# Patient Record
(demographics unavailable — no encounter records)

---

## 2025-01-27 NOTE — HISTORY OF PRESENT ILLNESS
[TextBox_4] : 59 yo man referred by Sridevi Parnell for pulmonary evaluation Had seen Dr Jodi Licea, pulmonary in the past  Smoker for 40 years, now progressively decreasing to 1/2 ppd after stenting in fall 2024  Given inhaler in past, doesnt know which Has had LCS last in Feb 2023, 2 small nodules in RUL  CAD, s/p stent at [facility] by Dr Ramirez in 2024 probable left main lesion HT on metoprolol, lisiopril, asa HLD on lipitor GERD on omeprazole Works as samira, , 2 children, works in [employer], lives in

## 2025-01-27 NOTE — PHYSICAL EXAM
[No Acute Distress] : no acute distress [Normal Oropharynx] : normal oropharynx [Normal Appearance] : normal appearance [No Neck Mass] : no neck mass [Normal Rate/Rhythm] : normal rate/rhythm [Normal S1, S2] : normal s1, s2 [No Murmurs] : no murmurs [No Resp Distress] : no resp distress [No Abnormalities] : no abnormalities [Benign] : benign [Normal Gait] : normal gait [No Clubbing] : no clubbing [No Cyanosis] : no cyanosis [No Edema] : no edema [FROM] : FROM [Normal Color/ Pigmentation] : normal color/ pigmentation [No Focal Deficits] : no focal deficits [Oriented x3] : oriented x3 [Normal Affect] : normal affect [TextBox_68] : Inspiratory wheezing is noted

## 2025-01-27 NOTE — ASSESSMENT
[FreeTextEntry1] : 57 yo man referred by Sridevi Parnell for pulmonary evaluation Had seen Dr Jodi Licea, pulmonary in the past  Smoker for 40 years, now progressively decreasing to 1/2 ppd after stenting in fall 2024  Given inhaler in past, doesnt know which Has had LCS last in Feb 2023, 2 small nodules in RUL  CAD, s/p stent at [facility] by Dr Ramirez in 2024 probable left main lesion HT on metoprolol, lisiopril, asa HLD on lipitor GERD on omeprazole Works as [employer], , 2 children, works in [employer], lives in [facility] 57 yo man with smoking history and wheezing Hx CAD, HT HLD GERD Probable COPD Last Lung Cancer screening at [facility] in Feb 2023--he is due again Will obtain full PFTs However, in view of wheezing, will begin stiolto and albuterol PRN RTC at time of his PFTs

## 2025-02-10 NOTE — HISTORY OF PRESENT ILLNESS
[Current] : Current [TextBox_13] : Mr. MORAN is a 58 year old male with a history of CAD and COPD.   He was called to review eligibility for Low-Dose CT lung cancer screening.  Reviewed and confirmed that the patient meets screening eligibility criteria:   58 years old   Smoking Status: Current Smoker   Number of pack(s) per day: 1 Number of years smoked: 40 Number of pack years smokin   No symptoms of lung cancer, including new cough, change in cough, hemoptysis, and unintentional weight loss.   No personal history of lung cancer.  No lung cancer in a first degree relative.  Exposure to silica. [PacksperYear] : 40

## 2025-03-17 NOTE — CONSULT LETTER
[Dear  ___] : Dear  [unfilled], [FreeTextEntry1] : I had the pleasure of evaluating your patient, JANNY MORAN , in the office today.  Please review my consultation and evaluation report that follows below.  Please do not hesitate to call me if further information is necessary or if you wish to discuss ongoing care or diagnostic work-up.    I very much appreciate your referral and it is a privilege to be able to provide care for your patient.  Sincerely,   Carlo Tan MD, MHCM, FACP, REJI-C Pulmonary Medicine  of Medicine Rajinder eric [facility] of [facility] at [facility] [place] [email or web address] -Pulmonary in [address] [place]  [address]    Fax   [facility] at [address]  [phone]

## 2025-03-17 NOTE — ASSESSMENT
[FreeTextEntry1] : 57 yo man referred by Sridevi Parnell for pulmonary evaluation Smoker for 40 years, now progressively decreasing to 1/2 ppd after stenting in fall 2024 Given inhaler in past, albuterol Has had LCS last in Feb 2023, 2 small nodules in RUL CAD, s/p stent at [facility] by Dr Ramirez in 2024 probable left main lesion HT on metoprolol, lisiopril, asa Brilinta, patch HLD on lipitor GERD on omeprazole Works as samira, , 2 children, works in [email or web address], lives in .  Interim: Had repeat LCS in Feb 2025: Stable BAYRON 0.5 cm nodule Lung RADS 2  PFTs today Normal volumes TLC and FRC , mnimal decr in midexpir flow FEV1 3.04  93% FEV1%   68% Suggestive of mild COPD c/w smoking history  Imp 57 yo man with long smoking history, still working on smoking cessation HT CAD, s/p stenting in the fall on Brilinta and asa Lung RADS 2--needs f/u scan in Feb 2026 Mild COPD, use albuterol PRN Recommend smoking cessation efforts continue discussed for six minutes --he is undergoing stress at this time and is working on it RTC 4 months
11

## 2025-03-17 NOTE — HISTORY OF PRESENT ILLNESS
[TextBox_4] : 59 yo man referred by Sridevi Parnell for pulmonary evaluation Smoker for 40 years, now progressively decreasing to 1/2 ppd after stenting in fall 2024 Given inhaler in past, albuterol Has had LCS last in Feb 2023, 2 small nodules in RUL CAD, s/p stent at [facility] by Dr Ramirez in 2024 probable left main lesion HT on metoprolol, lisiopril, asa Brilinta, patch HLD on lipitor GERD on omeprazole Works as samira, , 2 children, works in [employer], lives in .  Interim: Had repeat LCS in Feb 2025: Stable BAYRON 0.5 cm nodule Lung RADS 2  PFTs today Normal volumes TLC and FRC , mnimal decr in midexpir flow FEV1 3.04  93% FEV1%   68% Suggestive of mild COPD c/w smoking history